# Patient Record
Sex: FEMALE | Race: WHITE
[De-identification: names, ages, dates, MRNs, and addresses within clinical notes are randomized per-mention and may not be internally consistent; named-entity substitution may affect disease eponyms.]

---

## 2017-08-06 ENCOUNTER — HOSPITAL ENCOUNTER (EMERGENCY)
Dept: HOSPITAL 62 - ER | Age: 27
Discharge: HOME | End: 2017-08-06
Payer: MEDICAID

## 2017-08-06 VITALS — DIASTOLIC BLOOD PRESSURE: 72 MMHG | SYSTOLIC BLOOD PRESSURE: 118 MMHG

## 2017-08-06 DIAGNOSIS — N30.01: Primary | ICD-10-CM

## 2017-08-06 DIAGNOSIS — Z87.891: ICD-10-CM

## 2017-08-06 LAB
APPEARANCE UR: (no result)
BILIRUB UR QL STRIP: NEGATIVE
CHLAM PCR: NOT DETECTED
GLUCOSE UR STRIP-MCNC: NEGATIVE MG/DL
KETONES UR STRIP-MCNC: NEGATIVE MG/DL
NITRITE UR QL STRIP: NEGATIVE
PH UR STRIP: 6 [PH] (ref 5–9)
PROT UR STRIP-MCNC: 100 MG/DL
SP GR UR STRIP: 1.01
UROBILINOGEN UR-MCNC: NEGATIVE MG/DL (ref ?–2)

## 2017-08-06 PROCEDURE — 87491 CHLMYD TRACH DNA AMP PROBE: CPT

## 2017-08-06 PROCEDURE — 87591 N.GONORRHOEAE DNA AMP PROB: CPT

## 2017-08-06 PROCEDURE — 81025 URINE PREGNANCY TEST: CPT

## 2017-08-06 PROCEDURE — 99283 EMERGENCY DEPT VISIT LOW MDM: CPT

## 2017-08-06 PROCEDURE — 81001 URINALYSIS AUTO W/SCOPE: CPT

## 2017-08-06 NOTE — ER DOCUMENT REPORT
ED General





- General


Chief Complaint: Abdominal Pain


Stated Complaint: URINARY PROBLEM


Time Seen by Provider: 08/06/17 07:42


TRAVEL OUTSIDE OF THE [place] IN LAST 30 DAYS: No





- HPI


Patient complains to provider of: Suprapubic abdominal pain dysuria


Notes: 


Patient coming in for suprapubic abdominal pain and dysuria.  Patient denies 

any fevers chills nausea vomiting states has been swimming a lot recently.  

Patient states a history when she was younger of multiple UTIs.  Patient denies 

any recent antibiotics.





- Related Data


Allergies/Adverse Reactions: 


 





No Known Allergies Allergy (Verified 08/06/17 07:27)


 











Past Medical History





- Social History


Smoking Status: Former Smoker


Frequency of alcohol use: Occasional


Drug Abuse: None


Family History: Reviewed & Not Pertinent


Patient has suicidal ideation: No


Patient has homicidal ideation: No





- Past Medical History


Cardiac Medical History: 


   Denies: Hx Coronary Artery Disease, Hx Heart Attack, Hx Hypertension


Pulmonary Medical History: 


   Denies: Hx Asthma, Hx Bronchitis, Hx COPD, Hx Pneumonia


Neurological Medical History: Denies: Hx Cerebrovascular Accident, Hx Seizures


Renal/ Medical History: Denies: Hx Peritoneal Dialysis


Musculoskeltal Medical History: Denies Hx Arthritis


Past Surgical History: Reports: Hx Cholecystectomy





- Immunizations


Hx Diphtheria, Pertussis, Tetanus Vaccination: Yes





Review of Systems





- Review of Systems


Constitutional: No symptoms reported


EENT: No symptoms reported


Cardiovascular: No symptoms reported


Respiratory: No symptoms reported


Gastrointestinal: Abdominal pain


Genitourinary: Burning


Female Genitourinary: No symptoms reported


Musculoskeletal: No symptoms reported


Skin: No symptoms reported


Hematologic/Lymphatic: No symptoms reported


Neurological/Psychological: No symptoms reported


-: Yes All other systems reviewed and negative





Physical Exam





- Vital signs


Vitals: 


 











Temp Pulse Resp BP Pulse Ox


 


 98.0 F   78   16   113/81   99 


 


 08/06/17 07:20  08/06/17 07:20  08/06/17 07:20  08/06/17 07:20  08/06/17 07:20











Interpretation: Normal





- General


General appearance: Appears well, Alert





- HEENT


Head: Normocephalic, Atraumatic


Eyes: Normal


Pupils: PERRL





- Respiratory


Respiratory status: No respiratory distress


Chest status: Nontender


Breath sounds: Normal


Chest palpation: Normal





- Cardiovascular


Rhythm: Regular


Heart sounds: Normal auscultation


Murmur: No





- Abdominal


Inspection: Normal


Distension: No distension


Bowel sounds: Normal


Tenderness: Nontender


Organomegaly: No organomegaly





- Back


Back: Normal, Nontender





- Extremities


General upper extremity: Normal inspection, Nontender, Normal color, Normal ROM

, Normal temperature


General lower extremity: Normal inspection, Nontender, Normal color, Normal ROM

, Normal temperature, Normal weight bearing.  No: Moon's sign





- Neurological


Neuro grossly intact: Yes


Cognition: Normal


Orientation: AAOx4


[place] Coma Scale Eye Opening: Spontaneous


[place] Coma Scale Verbal: Oriented


[place] Coma Scale Motor: Obeys Commands


[place] Coma Scale Total: 15


Speech: Normal


Motor strength normal: LUE, RUE, LLE, RLE


Sensory: Normal





- Psychological


Associated symptoms: Normal affect, Normal mood





- Skin


Skin Temperature: Warm


Skin Moisture: Dry


Skin Color: Normal





Course





- Re-evaluation


Re-evalutation: 





08/06/17 14:51


Patient with uncomplicated UTI.  Patient will be started on Bactrim patient is 

follow-up primary care physician return to ER symptoms worsen.





- Vital Signs


Vital signs: 


 











Temp Pulse Resp BP Pulse Ox


 


 98.6 F   86   12   118/72   100 


 


 08/06/17 10:23  08/06/17 10:23  08/06/17 10:23  08/06/17 10:23  08/06/17 10:23














- Laboratory


Laboratory results interpreted by me: 


 











  08/06/17





  07:42


 


Urine Protein  100 H


 


Urine Blood  LARGE H


 


Ur Leukocyte Esterase  LARGE H














Discharge





- Discharge


Clinical Impression: 


Urinary tract infection


Qualifiers:


 Urinary tract infection type: acute cystitis Hematuria presence: with 

hematuria Qualified Code(s): N30.01 - Acute cystitis with hematuria





Condition: Good


Disposition: HOME, SELF-CARE


Instructions:  Urinary Anesthetic Agent ([facility]), Urinary Tract Infection ([facility]), 

Trimethoprim-Sulfa ([facility])


Additional Instructions: 


Take medication as prescribed.  May also take Tylenol and Motrin for pain 

control.  Return to the ER symptoms worsen.


Prescriptions: 


Phenazopyridine HCl [Pyridium 100 Mg Tablet] 100 mg PO TID #15 tablet


Sulfamethoxazole/Trimethoprim [Bactrim Ds Tablet] 1 each PO BID #20 tablet


Forms:  Return to Work

## 2017-08-22 ENCOUNTER — HOSPITAL ENCOUNTER (EMERGENCY)
Dept: HOSPITAL 62 - ER | Age: 27
Discharge: HOME | End: 2017-08-22
Payer: MEDICAID

## 2017-08-22 VITALS — SYSTOLIC BLOOD PRESSURE: 121 MMHG | DIASTOLIC BLOOD PRESSURE: 82 MMHG

## 2017-08-22 DIAGNOSIS — R35.0: Primary | ICD-10-CM

## 2017-08-22 DIAGNOSIS — R30.0: ICD-10-CM

## 2017-08-22 DIAGNOSIS — Z87.440: ICD-10-CM

## 2017-08-22 DIAGNOSIS — R39.15: ICD-10-CM

## 2017-08-22 LAB
APPEARANCE UR: (no result)
BILIRUB UR QL STRIP: NEGATIVE
GLUCOSE UR STRIP-MCNC: NEGATIVE MG/DL
KETONES UR STRIP-MCNC: 20 MG/DL
NITRITE UR QL STRIP: NEGATIVE
PH UR STRIP: 5 [PH] (ref 5–9)
PROT UR STRIP-MCNC: NEGATIVE MG/DL
SP GR UR STRIP: 1.01
UROBILINOGEN UR-MCNC: NEGATIVE MG/DL (ref ?–2)

## 2017-08-22 PROCEDURE — 99283 EMERGENCY DEPT VISIT LOW MDM: CPT

## 2017-08-22 PROCEDURE — 81001 URINALYSIS AUTO W/SCOPE: CPT

## 2017-08-22 PROCEDURE — 87086 URINE CULTURE/COLONY COUNT: CPT

## 2017-08-22 NOTE — ER DOCUMENT REPORT
ED GI/





- General


Chief Complaint: Urinary Frequency


Stated Complaint: URINARY ISSUES,ABDOMINAL PAIN


Time Seen by Provider: 08/22/17 19:33


Mode of Arrival: Ambulatory


Information source: Patient


Notes: 





26-year-old female presents to ED for complaint of urinary symptoms frequency 

urgency and burning.  She states she was seen in the emergency room 2 weeks ago 

for the same symptoms and was treated with Bactrim and finished this a week 

ago.  She states she has not been able to follow-up with her primary doctor 

when she started burning and having some discomfort 2 days ago pressure has 

become worse.  She states that after she finished with her Bactrim she got a 

yeast infection and took the Monistat which took care of that.


TRAVEL OUTSIDE OF THE [place] IN LAST 30 DAYS: No





- HPI


Patient complains to provider of: Other - Urinary frequency urgency and burning 

with pressure


Onset: Other - 2 days ago


Timing/Duration: Gradual


Quality of pain: Burning, Pressure


Severity at maximum: Mild


Severity in ED: Mild


Pain Level: 2


Location: Suprapubic


Vaginal bleeding (Compared to normal period): None


Associated symptoms: Urinary frequency, Urinary urgency, Other - States she is 

having burning with urination and pressure in her suprapubic area


Exacerbated by: Other - Urination


Relieved by: Denies


Similar symptoms previously: Yes


Recently seen / treated by doctor: Yes





- Related Data


Allergies/Adverse Reactions: 


 





No Known Allergies Allergy (Verified 08/22/17 17:59)


 











Past Medical History





- General


Information source: Patient





- Social History


Smoking Status: Never Smoker


Cigarette use (# per day): No


Chew tobacco use (# tins/day): No


Smoking Education Provided: No


Frequency of alcohol use: None


Drug Abuse: None


Lives with: Family


Family History: Reviewed & Not Pertinent


Patient has suicidal ideation: No


Patient has homicidal ideation: No





- Past Medical History


Cardiac Medical History: Reports: None


Pulmonary Medical History: Reports: None


EENT Medical History: Reports: None


Neurological Medical History: Reports: None


Endocrine Medical History: Reports: None


Renal/ Medical History: Reports: Other - Frequent UTIs


Malignancy Medical History: Reports: None


GI Medical History: Reports: None


Musculoskeltal Medical History: Reports None


Skin Medical History: Reports None


Psychiatric Medical History: Reports: None


Traumatic Medical History: Reports: None


Infectious Medical History: Reports: None


Past Surgical History: Reports: Hx Cholecystectomy





- Immunizations


Hx Diphtheria, Pertussis, Tetanus Vaccination: Yes





Review of Systems





- Review of Systems


Constitutional: No symptoms reported


EENT: No symptoms reported


Cardiovascular: No symptoms reported


Respiratory: No symptoms reported


Gastrointestinal: No symptoms reported


Genitourinary: Frequency, Urgency, Other - Burning with urination and pressure 

to the bladder


Female Genitourinary: No symptoms reported


Musculoskeletal: No symptoms reported


Skin: No symptoms reported


Hematologic/Lymphatic: No symptoms reported


Neurological/Psychological: No symptoms reported


-: Yes All other systems reviewed and negative





Physical Exam





- Vital signs


Vitals: 


 











Temp Pulse BP Pulse Ox


 


 98.3 F   68   123/72   99 


 


 08/22/17 17:59  08/22/17 17:59  08/22/17 17:59  08/22/17 17:59











Interpretation: Normal





- General


General appearance: Appears well, Alert





- HEENT


Head: Normocephalic, Atraumatic


Eyes: Normal


Pupils: PERRL





- Respiratory


Respiratory status: No respiratory distress


Chest status: Nontender


Breath sounds: Normal


Chest palpation: Normal





- Cardiovascular


Rhythm: Regular


Heart sounds: Normal auscultation


Murmur: No





- Abdominal


Inspection: Normal


Distension: No distension


Bowel sounds: Normal


Tenderness: Tender - Suprapubic tenderness


Organomegaly: No organomegaly





- Back


Back: Normal, Nontender





- Extremities


General upper extremity: Normal inspection, Nontender, Normal color, Normal ROM

, Normal temperature


General lower extremity: Normal inspection, Nontender, Normal color, Normal ROM

, Normal temperature, Normal weight bearing.  No: Moon's sign





- Neurological


Neuro grossly intact: Yes


Cognition: Normal


Orientation: AAOx4


Betty Coma Scale Eye Opening: Spontaneous


[place] Coma Scale Verbal: Oriented


[place] Coma Scale Motor: Obeys Commands


[place] Coma Scale Total: 15


Speech: Normal


Motor strength normal: LUE, RUE, LLE, RLE


Sensory: Normal





- Psychological


Associated symptoms: Normal affect, Normal mood





- Skin


Skin Temperature: Warm


Skin Moisture: Dry


Skin Color: Normal





Course





- Re-evaluation


Re-evalutation: 





08/22/17 22:14


Discussed urine results with patient.  Patient became very angry that her urine 

was negative.  She states she has spent all this time and money to come to the 

ER and it was of no use.  States she just wanted her discharge papers so she 

could go home.  She refused any medicine for bladder spasms or urinary 

discomfort.











- Vital Signs


Vital signs: 


 











Temp Pulse Resp BP Pulse Ox


 


 98.0 F   67   16   121/82   99 


 


 08/22/17 21:26  08/22/17 21:26  08/22/17 21:26  08/22/17 21:26  08/22/17 21:26














- Laboratory


Laboratory results interpreted by me: 


 











  08/22/17





  19:46


 


Urine Ketones  20 H


 


Urine Blood  SMALL H














Discharge





- Discharge


Clinical Impression: 


 Urinary frequency





Condition: Stable


Disposition: HOME, SELF-CARE


Instructions:  Family Physicians / Practices


Additional Instructions: 


You were seen today for urinary frequency urgency and discomfort.





Your urinalysis was negative but I have sent it for a culture to ensure that 

there is no growth.  There is no leukocytes or nitrates in your urine.





Increase your p.o. fluid intake and follow-up with your primary doctor.





FOLLOW-UP CARE:


If you have been referred to a physician for follow-up care, call the physician

s office for an appointment as you were instructed or within the next two days.

  If you experience worsening or a significant change in your symptoms, notify 

the physician immediately or return to the Emergency Department at any time for 

re-evaluation.

## 2018-09-07 ENCOUNTER — HOSPITAL ENCOUNTER (OUTPATIENT)
Dept: HOSPITAL 62 - LAB | Age: 28
End: 2018-09-07
Payer: SELF-PAY

## 2018-09-07 DIAGNOSIS — N39.0: Primary | ICD-10-CM

## 2018-09-07 LAB
BACTERIA (WET MOUNT): (no result)
CHLAM PCR: NOT DETECTED
EPITHELIALS (WET MOUNT): (no result)
GON PCR: NOT DETECTED
T.VAGINALIS (WET MOUNT): (no result)
WBCS (WET MOUNT): (no result)
YEAST (WET MOUNT): (no result)

## 2018-09-07 PROCEDURE — 87591 N.GONORRHOEAE DNA AMP PROB: CPT

## 2018-09-07 PROCEDURE — 87491 CHLMYD TRACH DNA AMP PROBE: CPT

## 2018-09-07 PROCEDURE — 87086 URINE CULTURE/COLONY COUNT: CPT

## 2018-09-07 PROCEDURE — 87210 SMEAR WET MOUNT SALINE/INK: CPT

## 2019-06-13 ENCOUNTER — HOSPITAL ENCOUNTER (OUTPATIENT)
Dept: HOSPITAL 62 - LAB | Age: 29
End: 2019-06-13
Attending: NURSE PRACTITIONER
Payer: COMMERCIAL

## 2019-06-13 DIAGNOSIS — N89.8: Primary | ICD-10-CM

## 2019-06-13 LAB
BACTERIA (WET MOUNT): (no result)
EPITHELIALS (WET MOUNT): (no result)
RBCS (WET MOUNT): (no result)
T.VAGINALIS (WET MOUNT): (no result)
WBCS (WET MOUNT): (no result)
YEAST (WET MOUNT): (no result)

## 2019-06-13 PROCEDURE — 87210 SMEAR WET MOUNT SALINE/INK: CPT

## 2019-09-30 ENCOUNTER — HOSPITAL ENCOUNTER (OUTPATIENT)
Dept: HOSPITAL 62 - LAB | Age: 29
End: 2019-09-30
Attending: NURSE PRACTITIONER
Payer: COMMERCIAL

## 2019-09-30 DIAGNOSIS — N89.8: Primary | ICD-10-CM

## 2019-09-30 DIAGNOSIS — R30.0: ICD-10-CM

## 2019-09-30 LAB
BACTERIA (WET MOUNT): (no result)
CHLAM PCR: NOT DETECTED
RBCS (WET MOUNT): (no result)
T.VAGINALIS (WET MOUNT): (no result)
WBCS (WET MOUNT): (no result)
YEAST (WET MOUNT): (no result)

## 2019-09-30 PROCEDURE — 87210 SMEAR WET MOUNT SALINE/INK: CPT

## 2019-09-30 PROCEDURE — 87086 URINE CULTURE/COLONY COUNT: CPT

## 2019-09-30 PROCEDURE — 87591 N.GONORRHOEAE DNA AMP PROB: CPT

## 2019-09-30 PROCEDURE — 87491 CHLMYD TRACH DNA AMP PROBE: CPT

## 2019-09-30 PROCEDURE — 87088 URINE BACTERIA CULTURE: CPT

## 2020-08-19 ENCOUNTER — HOSPITAL ENCOUNTER (EMERGENCY)
Dept: HOSPITAL 62 - ER | Age: 30
Discharge: HOME | End: 2020-08-19
Payer: SELF-PAY

## 2020-08-19 VITALS — DIASTOLIC BLOOD PRESSURE: 79 MMHG | SYSTOLIC BLOOD PRESSURE: 134 MMHG

## 2020-08-19 DIAGNOSIS — M54.42: Primary | ICD-10-CM

## 2020-08-19 DIAGNOSIS — F17.200: ICD-10-CM

## 2020-08-19 PROCEDURE — 36415 COLL VENOUS BLD VENIPUNCTURE: CPT

## 2020-08-19 PROCEDURE — 84703 CHORIONIC GONADOTROPIN ASSAY: CPT

## 2020-08-19 PROCEDURE — 96372 THER/PROPH/DIAG INJ SC/IM: CPT

## 2020-08-19 PROCEDURE — 99284 EMERGENCY DEPT VISIT MOD MDM: CPT

## 2020-08-19 NOTE — ER DOCUMENT REPORT
HPI





- HPI


Patient complains to provider of: Back pain


Time Seen by Provider: 08/19/20 13:04


Pain Level: 4


Context: 





29-year-old female with no previous medical problems presents to the emergency 

room complaining of lower back pain that radiates down her left leg for the past

3 weeks.  States she is been trying stretching exercises taking Aleve, Tylenol 

using ice and heat nothing seems to be alleviating her pain.  Denies any trauma 

or injury.  No history of herniated disks.  Denies any urinary symptoms.  States

is painful to walk but she is able to walk.  She denies any loss control of 

bowels or bladder.  No saddle anesthesia.  No red flags.


Associated Symptoms: None


Exacerbated by: Movement, Walking


Relieved by: Denies


Similar symptoms previously: No


Recently seen / treated by doctor: No





- ROS


Systems Reviewed and Negative: Yes All other systems reviewed and negative





- CONSTITUTIONAL


Constitutional: DENIES: Fever, Chills





- EENT


EENT: DENIES: Sore Throat, Ear Pain, Eye problems





- NEURO


Neurology: DENIES: Headache, Weakness, Vision blurred, Dizzinesss / Vertigo





- CARDIOVASCULAR


Cardiovascular: DENIES: Chest pain





- RESPIRATORY


Respiratory: DENIES: Trouble Breathing, Coughing





- GASTROINTESTINAL


Gastrointestinal: DENIES: Abdominal Pain, Black / Bloody Stools





- URINARY


Urinary: DENIES: Dysuria, Urgency, Frequency





- REPRODUCTIVE


LMP: 2 weeks ago


Reproductive: DENIES: Pregnant:





- MUSCULOSKELETAL


Musculoskeletal: REPORTS: Back Pain.  DENIES: Extremity pain





Past Medical History





- General


Information source: Patient





- Social History


Smoking Status: Current Every Day Smoker


Chew tobacco use (# tins/day): No


Frequency of alcohol use: Occasional


Family History: Reviewed & Not Pertinent


Patient has homicidal ideation: No





- Past Medical History


Cardiac Medical History: 


   Denies: Hx Coronary Artery Disease, Hx Heart Attack, Hx Hypertension


Pulmonary Medical History: 


   Denies: Hx Asthma, Hx Bronchitis, Hx COPD, Hx Pneumonia


Neurological Medical History: Denies: Hx Cerebrovascular Accident, Hx Seizures


Renal/ Medical History: Denies: Hx Peritoneal Dialysis


Musculoskeletal Medical History: Denies Hx Arthritis


Psychiatric Medical History: Reports: Hx Attention Deficit Hyperactivity 

Disorder


Past Surgical History: Reports: Hx Cholecystectomy





- Immunizations


Hx Diphtheria, Pertussis, Tetanus Vaccination: Yes





Vertical Provider Document





- CONSTITUTIONAL


Agree With Documented VS: Yes


Exam Limitations: No Limitations


General Appearance: Moderate Distress





- INFECTION CONTROL


TRAVEL OUTSIDE OF THE [place] IN LAST 30 DAYS: No





- HEENT


HEENT: Atraumatic, Normocephalic





- NECK


Neck: Normal Inspection, Supple





- RESPIRATORY


Respiratory: Breath Sounds Normal, No Respiratory Distress, Chest Non-Tender





- CARDIOVASCULAR


Cardiovascular: Regular Rate, Regular Rhythm, No Murmur





- GI/ABDOMEN


Gastrointestinal: Abdomen Soft, Abdomen Non-Tender





- BACK


Back: Abnormal Inspection - There is tenderness on palpation from L4-S1.  There 

is tenderness of the left sciatic notch.  Positive straight leg raising on the 

left at 40 degrees.  Negative straight leg raising on the right.  No step-offs. 

No obvious deformities..  negative: CVA Tenderness-Right, CVA Tenderness-Left





- MUSCULOSKELETAL/EXTREMETIES


Musculoskeletal/Extremeties: FROM





- NEURO


Level of Consciousness: Awake, Alert, Appropriate


Motor/Sensory: No Motor Deficit, No Sensory Deficit


Deep Tendon Reflexes: 2+


Notes: 





Gait not tested secondary to pain





- DERM


Integumentary: Warm, Dry, No Rash





Course





- Re-evaluation


Re-evalutation: 





08/19/20 16:10


Patient is resting comfortably with decreased pain.  She is able to ambulate and

walk with minimal assistance.  She is neurovascularly intact.  Will discharge 

home on Flexeril, prednisone, Naprosyn.  She is counseled on the importance to 

follow-up with primary care physician if not improving in 2 to 3 days.  On-call 

physician was provided.  Patient was given strict return to the emergency room 

guidelines.  Return for any new or worsening symptoms.  All questions were 

answered.  Patient verbalized understanding and agrees with plan of care.





- Vital Signs


Vital signs: 


                                        











Temp Pulse Resp BP Pulse Ox


 


 98.3 F   83   15   134/79 H  100 


 


 08/19/20 12:37  08/19/20 12:37  08/19/20 12:37  08/19/20 12:37  08/19/20 12:37














Discharge





- Discharge


Clinical Impression: 


 Back pain with left-sided sciatica





Condition: Stable


Disposition: HOME, SELF-CARE


Instructions:  Low Back Pain ([facility]), Sciatica ([facility]), Warm Packs ([facility])


Additional Instructions: 


You have been seen in the Emergency Department (ED)  today for back pain.  Your 

workup and exam have not shown any acute abnormalities and you are likely s

uffering from muscle strain or possible problems with your discs, but there is 

no treatment that will fix your symptoms at this time.  Please take the 

Flexeril, naproxen, Flexeril that has been prescribed as directed. You should 

also purchase a local lidocaine cream such as "aspercreme with lidocaine" and 

use per bottle instructions to the affected area. Apply heat to the area as 

often as you are able. Continue to keep active and avoid prolonged periods of 

bed rest.


 


Please follow up with your doctor as soon as possible regarding today's ED visit

and your back pain.  Return to the ED for worsening back pain, fever, weakness 

or numbness of either leg, or if you develop either (1) an inability to urinate 

or have bowel movements, or (2) loss of your ability to control your bathroom 

functions (if you start having "accidents"), or if you develop other new 

symptoms that concern you.concern you.


Prescriptions: 


Prednisone [Deltasone 20 mg Tablet] See Protocol PO DAILY 9 Days #18 tablet


Cyclobenzaprine HCl [Flexeril 10 mg Tablet] 10 mg PO TIDP PRN #15 tab


 PRN Reason: 


Naproxen 500 mg PO BID PRN #14 tablet


 PRN Reason: 


Forms:  Return to Work


Referrals: 


PRICILLA MCDOWELL [Primary Care Provider] - Follow up as needed


HEIKE RAMIREZ MD [ACTIVE STAFF] - Follow up as needed